# Patient Record
Sex: MALE | Race: WHITE | NOT HISPANIC OR LATINO | Employment: FULL TIME | URBAN - METROPOLITAN AREA
[De-identification: names, ages, dates, MRNs, and addresses within clinical notes are randomized per-mention and may not be internally consistent; named-entity substitution may affect disease eponyms.]

---

## 2024-06-19 ENCOUNTER — HOSPITAL ENCOUNTER (EMERGENCY)
Facility: HOSPITAL | Age: 24
Discharge: HOME | End: 2024-06-20
Attending: EMERGENCY MEDICINE
Payer: COMMERCIAL

## 2024-06-19 DIAGNOSIS — Z3A.01 LESS THAN 8 WEEKS GESTATION OF PREGNANCY: ICD-10-CM

## 2024-06-19 DIAGNOSIS — O21.9 VOMITING DURING PREGNANCY: Primary | ICD-10-CM

## 2024-06-19 DIAGNOSIS — N30.00 ACUTE CYSTITIS WITHOUT HEMATURIA: ICD-10-CM

## 2024-06-19 PROCEDURE — 99284 EMERGENCY DEPT VISIT MOD MDM: CPT

## 2024-06-19 RX ORDER — ONDANSETRON HYDROCHLORIDE 2 MG/ML
8 INJECTION, SOLUTION INTRAVENOUS
Status: COMPLETED | OUTPATIENT
Start: 2024-06-20 | End: 2024-06-20

## 2024-06-19 NOTE — Clinical Note
"Gagandeep Leija" Abhay was seen and treated in our emergency department on 6/19/2024.  He may return to work on 06/21/2024.       If you have any questions or concerns, please don't hesitate to call.      Idania Fitch RN    "

## 2024-06-20 VITALS
RESPIRATION RATE: 18 BRPM | HEART RATE: 80 BPM | WEIGHT: 145 LBS | OXYGEN SATURATION: 99 % | HEIGHT: 63 IN | BODY MASS INDEX: 25.69 KG/M2 | TEMPERATURE: 98 F | SYSTOLIC BLOOD PRESSURE: 120 MMHG | DIASTOLIC BLOOD PRESSURE: 72 MMHG

## 2024-06-20 LAB
ADDITIONAL FINDINGS (OHS): ABNORMAL
ALBUMIN SERPL-MCNC: 4 G/DL (ref 3.5–5)
ALBUMIN/GLOB SERPL: 1.2 RATIO (ref 1.1–2)
ALP SERPL-CCNC: 51 UNIT/L (ref 40–150)
ALT SERPL-CCNC: 11 UNIT/L (ref 0–55)
ANION GAP SERPL CALC-SCNC: 10 MEQ/L
AST SERPL-CCNC: 14 UNIT/L (ref 5–34)
B-HCG UR QL: POSITIVE
BACTERIA #/AREA URNS AUTO: ABNORMAL /HPF
BASOPHILS # BLD AUTO: 0.06 X10(3)/MCL
BASOPHILS NFR BLD AUTO: 0.4 %
BILIRUB SERPL-MCNC: 0.6 MG/DL
BILIRUB UR QL STRIP.AUTO: NEGATIVE
BUN SERPL-MCNC: 5 MG/DL (ref 8.9–20.6)
CALCIUM SERPL-MCNC: 9.2 MG/DL (ref 8.4–10.2)
CHLORIDE SERPL-SCNC: 110 MMOL/L (ref 98–107)
CLARITY UR: CLEAR
CO2 SERPL-SCNC: 18 MMOL/L (ref 22–29)
COLOR UR AUTO: YELLOW
CREAT SERPL-MCNC: 0.69 MG/DL (ref 0.73–1.18)
CREAT/UREA NIT SERPL: 7
EOSINOPHIL # BLD AUTO: 0.05 X10(3)/MCL (ref 0–0.9)
EOSINOPHIL NFR BLD AUTO: 0.4 %
ERYTHROCYTE [DISTWIDTH] IN BLOOD BY AUTOMATED COUNT: 11.8 % (ref 11.5–17)
GFR SERPLBLD CREATININE-BSD FMLA CKD-EPI: >60 ML/MIN/1.73/M2
GLOBULIN SER-MCNC: 3.4 GM/DL (ref 2.4–3.5)
GLUCOSE SERPL-MCNC: 103 MG/DL (ref 74–100)
GLUCOSE UR QL STRIP: NEGATIVE
HCT VFR BLD AUTO: 37.8 % (ref 42–52)
HGB BLD-MCNC: 13 G/DL (ref 14–18)
HGB UR QL STRIP: NEGATIVE
IMM GRANULOCYTES # BLD AUTO: 0.06 X10(3)/MCL (ref 0–0.04)
IMM GRANULOCYTES NFR BLD AUTO: 0.4 %
KETONES UR QL STRIP: ABNORMAL
LEUKOCYTE ESTERASE UR QL STRIP: ABNORMAL
LYMPHOCYTES # BLD AUTO: 3.25 X10(3)/MCL (ref 0.6–4.6)
LYMPHOCYTES NFR BLD AUTO: 23.4 %
MCH RBC QN AUTO: 29.9 PG (ref 27–31)
MCHC RBC AUTO-ENTMCNC: 34.4 G/DL (ref 33–36)
MCV RBC AUTO: 86.9 FL (ref 80–94)
MONOCYTES # BLD AUTO: 0.68 X10(3)/MCL (ref 0.1–1.3)
MONOCYTES NFR BLD AUTO: 4.9 %
NEUTROPHILS # BLD AUTO: 9.77 X10(3)/MCL (ref 2.1–9.2)
NEUTROPHILS NFR BLD AUTO: 70.5 %
NITRITE UR QL STRIP: NEGATIVE
PH UR STRIP: 6.5 [PH]
PLATELET # BLD AUTO: 117 X10(3)/MCL (ref 130–400)
PLATELET # BLD EST: ABNORMAL 10*3/UL
PMV BLD AUTO: 10 FL (ref 7.4–10.4)
POTASSIUM SERPL-SCNC: 3.4 MMOL/L (ref 3.5–5.1)
PROT SERPL-MCNC: 7.4 GM/DL (ref 6.4–8.3)
PROT UR QL STRIP: NEGATIVE
RBC # BLD AUTO: 4.35 X10(6)/MCL
RBC #/AREA URNS AUTO: ABNORMAL /HPF
SODIUM SERPL-SCNC: 138 MMOL/L (ref 136–145)
SP GR UR STRIP.AUTO: 1.02 (ref 1–1.03)
SQUAMOUS #/AREA URNS AUTO: ABNORMAL /HPF
UROBILINOGEN UR STRIP-ACNC: 0.2
WBC # BLD AUTO: 13.87 X10(3)/MCL (ref 4.5–11.5)
WBC #/AREA URNS AUTO: ABNORMAL /HPF

## 2024-06-20 PROCEDURE — 81025 URINE PREGNANCY TEST: CPT | Performed by: EMERGENCY MEDICINE

## 2024-06-20 PROCEDURE — 80053 COMPREHEN METABOLIC PANEL: CPT | Performed by: EMERGENCY MEDICINE

## 2024-06-20 PROCEDURE — 63600175 PHARM REV CODE 636 W HCPCS: Performed by: EMERGENCY MEDICINE

## 2024-06-20 PROCEDURE — 96375 TX/PRO/DX INJ NEW DRUG ADDON: CPT

## 2024-06-20 PROCEDURE — 87077 CULTURE AEROBIC IDENTIFY: CPT | Performed by: EMERGENCY MEDICINE

## 2024-06-20 PROCEDURE — 96361 HYDRATE IV INFUSION ADD-ON: CPT

## 2024-06-20 PROCEDURE — 85025 COMPLETE CBC W/AUTO DIFF WBC: CPT | Performed by: EMERGENCY MEDICINE

## 2024-06-20 PROCEDURE — 96365 THER/PROPH/DIAG IV INF INIT: CPT

## 2024-06-20 PROCEDURE — 25000003 PHARM REV CODE 250: Performed by: EMERGENCY MEDICINE

## 2024-06-20 PROCEDURE — 81003 URINALYSIS AUTO W/O SCOPE: CPT | Performed by: EMERGENCY MEDICINE

## 2024-06-20 RX ORDER — NITROFURANTOIN 25; 75 MG/1; MG/1
100 CAPSULE ORAL 2 TIMES DAILY
Qty: 10 CAPSULE | Refills: 0 | Status: SHIPPED | OUTPATIENT
Start: 2024-06-20 | End: 2024-06-25

## 2024-06-20 RX ORDER — ONDANSETRON HYDROCHLORIDE 8 MG/1
8 TABLET, FILM COATED ORAL EVERY 6 HOURS PRN
Qty: 8 TABLET | Refills: 0 | Status: SHIPPED | OUTPATIENT
Start: 2024-06-20

## 2024-06-20 RX ADMIN — ONDANSETRON 8 MG: 2 INJECTION INTRAMUSCULAR; INTRAVENOUS at 12:06

## 2024-06-20 RX ADMIN — POTASSIUM BICARBONATE 25 MEQ: 977.5 TABLET, EFFERVESCENT ORAL at 01:06

## 2024-06-20 RX ADMIN — CEFTRIAXONE SODIUM 1 G: 1 INJECTION, POWDER, FOR SOLUTION INTRAMUSCULAR; INTRAVENOUS at 01:06

## 2024-06-20 RX ADMIN — SODIUM CHLORIDE, SODIUM LACTATE, POTASSIUM CHLORIDE, CALCIUM CHLORIDE AND DEXTROSE MONOHYDRATE 1000 ML: 5; 600; 310; 30; 20 INJECTION, SOLUTION INTRAVENOUS at 12:06

## 2024-06-20 NOTE — DISCHARGE INSTRUCTIONS
1. It's important to keep hydrated throughout the next 24 hours: Water, variously resist Gatorade, Powerade etc. are good ways to do this. No soft drinks and certainly no diet soft drinks. Avoid orange juice rapid juice in the first 24 hours in that this may actually precipitate additional vomiting. The key to adequate hydration with gastritis and the requisite nausea is small aliquots of drinking very frequently to avoid precipitating vomiting.    2. As tolerated, advance after 12-18 hours to beef bullion, chicken noodle soup, egg-drop or wonton soups. Mash potatoes, scrambled eggs, applesauce and pudding's are also a good way to get calories and sustenance.    3. Zofran has been prescribed to help with nausea. Take as directed.    4. seek reevaluation should urinary output drop significantly below normal for 6-8 hours, inability to tolerate any fluids whatsoever, fever with vomiting, or accelerating/persistent abdominal pain.

## 2024-06-20 NOTE — ED PROVIDER NOTES
Encounter Date: 6/19/2024       History     Chief Complaint   Patient presents with    Emesis     Pt c/o vomiting x 1 week. Pt is 6 weeks pregnant.     Gagandeep is a 23-year-old genotypic female, completely transitioned (phenotypic) male who presents with recurrent, multiple episodes of non-bilious vomiting for 6-7 days. He is 6 weeks pregnant, by dates,     The history is provided by the patient.     Review of patient's allergies indicates:  No Known Allergies  History reviewed. No pertinent past medical history.  History reviewed. No pertinent surgical history.  No family history on file.     Review of Systems   Constitutional:  Negative for fever.   Eyes:  Negative for visual disturbance.   Respiratory:  Negative for shortness of breath.    Cardiovascular:  Negative for chest pain.   Gastrointestinal:  Negative for nausea.   Endocrine: Negative for polydipsia, polyphagia and polyuria.   Genitourinary:  Negative for dysuria, flank pain, hematuria and pelvic pain.   Musculoskeletal:  Negative for back pain.   Skin:  Negative for wound.   Allergic/Immunologic: Negative for immunocompromised state.   Neurological:  Positive for weakness. Negative for headaches.   Hematological:  Does not bruise/bleed easily.   Psychiatric/Behavioral: Negative.     All other systems reviewed and are negative.      Physical Exam     Initial Vitals [06/19/24 2335]   BP Pulse Resp Temp SpO2   116/79 81 20 98.3 °F (36.8 °C) 97 %      MAP       --         Physical Exam    ED Course   Procedures  Labs Reviewed   COMPREHENSIVE METABOLIC PANEL - Abnormal; Notable for the following components:       Result Value    Potassium 3.4 (*)     Chloride 110 (*)     CO2 18 (*)     Glucose 103 (*)     Blood Urea Nitrogen 5.0 (*)     Creatinine 0.69 (*)     All other components within normal limits   CBC WITH DIFFERENTIAL - Abnormal; Notable for the following components:    WBC 13.87 (*)     Hgb 13.0 (*)     Hct 37.8 (*)     Platelet 117 (*)     Neut # 9.77  (*)     IG# 0.06 (*)     All other components within normal limits   BLOOD SMEAR MICROSCOPIC EXAM (OLG) - Abnormal; Notable for the following components:    Platelets Decreased (*)     All other components within normal limits   URINALYSIS, REFLEX TO URINE CULTURE - Abnormal; Notable for the following components:    Ketones, UA Trace (*)     Leukocyte Esterase, UA 1+ (*)     All other components within normal limits   HCG QUALITATIVE URINE - Abnormal; Notable for the following components:    hCG Qualitative, Urine Positive (*)     All other components within normal limits   URINALYSIS, MICROSCOPIC - Abnormal; Notable for the following components:    Bacteria, UA Many (*)     WBC, UA 11-20 (*)     Squamous Epithelial Cells, UA Few (*)     All other components within normal limits   CULTURE, URINE   CBC W/ AUTO DIFFERENTIAL    Narrative:     The following orders were created for panel order CBC auto differential.  Procedure                               Abnormality         Status                     ---------                               -----------         ------                     CBC with Differential[[id number]]       Abnormal            Final result                 Please view results for these tests on the individual orders.          Imaging Results    None          Medications   cefTRIAXone (Rocephin) 1 g in dextrose 5 % in water (D5W) 100 mL IVPB (MB+) (1 g Intravenous New Bag 6/20/24 0144)   dextrose 5% lactated ringers bolus 1,000 mL (0 mLs Intravenous Stopped 6/20/24 0116)   ondansetron injection 8 mg (8 mg Intravenous Given 6/20/24 0011)   potassium bicarbonate disintegrating tablet 25 mEq (25 mEq Oral Given 6/20/24 0122)     Medical Decision Making  Amount and/or Complexity of Data Reviewed  Labs: ordered.    Risk  Prescription drug management.               ED Course as of 06/20/24 0202   u Jun 20, 2024   0018 Creatinine(!): 0.69 [KJ]   0041 Potassium(!): 3.4 [KJ]   0042 Chloride(!): 110 [KJ]   0042  "CO2(!): 18 [KJ]   0042 Glucose(!): 103 [KJ]   0042 BUN(!): 5.0 [KJ]   0042 WBC(!): 13.87 [KJ]   0133 Bacteria, UA(!): Many [KJ]   0133 RBC, UA: None Seen [KJ]   0133 WBC, UA(!): 11-20 [KJ]   0133 Squam Epithel, UA(!): Few [KJ]   0133 hCG Qualitative, Urine(!): Positive [KJ]   0159 Tolerated parental hydration in the emergency department without difficulty.  Potassium supplemented with 25 mEq p.o..      Urine HCG is positive, confirmed.  Likewise, urinalysis consistent with urinary tract infection: Patient then informs "I seemed to be prone to getting those. "    Upon reassessment, feeling better, clinically improved.  Rocephin, 1 g IV piggyback, administered here in the department given "his" proclivity for urinary tract infections and the fact that "he" is in "his" 1st trimester of pregnancy.   [KJ]      ED Course User Index  [KJ] Blanco Ugarte MD                             Clinical Impression:  Final diagnoses:  [Z3A.01] Less than 8 weeks gestation of pregnancy  [O21.9] Vomiting during pregnancy (Primary)  [N30.00] Acute cystitis without hematuria          ED Disposition Condition    Discharge Stable          ED Prescriptions       Medication Sig Dispense Start Date End Date Auth. Provider    nitrofurantoin, macrocrystal-monohydrate, (MACROBID) 100 MG capsule Take 1 capsule (100 mg total) by mouth 2 (two) times daily. for 5 days 10 capsule 6/20/2024 6/25/2024 Blanco Ugarte MD          Follow-up Information    None       Afebrile. Vasomotor Stable. Ambulating in the ED. Pleased with care.  Patient specifically advised, by me, to RETURN to the emergency department for ANY deterioration, detrimental changes, failure to improve, feeling worse and/or ANY concerns whatsoever. Verbalized understanding and agreed to comply.        This treatment record was composed utilizing a combination of typing and the M*Modal Fluency Direct dictation system. Some errors in transcription, wording and spelling are, therefore, " to be expected.       Blanco Ugarte MD  06/20/24 0208

## 2024-06-22 ENCOUNTER — TELEPHONE (OUTPATIENT)
Dept: EMERGENCY MEDICINE | Facility: HOSPITAL | Age: 24
End: 2024-06-22
Payer: COMMERCIAL

## 2024-06-22 LAB — BACTERIA UR CULT: ABNORMAL

## 2024-07-29 LAB
ANTIBODY SCREEN: NORMAL
HBV SURFACE AG SERPL QL IA: NORMAL
HCV AB SERPL QL IA: NEGATIVE
HIV 1+2 AB+HIV1 P24 AG SERPL QL IA: NORMAL
RUBV IGG SER QL: NORMAL
T PALLIDUM AB SER QL: NONREACTIVE

## 2024-10-16 LAB
GLUCOSE SERPL-MCNC: 138 MG/DL
HCT VFR BLD AUTO: 34.1 %
HGB BLD-MCNC: 11.4 G/DL
MCV RBC AUTO: 92 FL (ref 82–108)
PLATELET # BLD AUTO: 268 K/UL (ref 150–399)

## 2025-02-10 ENCOUNTER — HOSPITAL ENCOUNTER (INPATIENT)
Facility: HOSPITAL | Age: 25
LOS: 2 days | Discharge: HOME | End: 2025-02-12
Attending: OBSTETRICS & GYNECOLOGY | Admitting: OBSTETRICS & GYNECOLOGY
Payer: COMMERCIAL

## 2025-02-10 DIAGNOSIS — Z37.9 NORMAL LABOR: ICD-10-CM

## 2025-02-10 DIAGNOSIS — O48.0 POST TERM PREGNANCY OVER 40 WEEKS: ICD-10-CM

## 2025-02-10 LAB
BASOPHILS # BLD AUTO: 0.04 X10(3)/MCL
BASOPHILS NFR BLD AUTO: 0.3 %
CTP QC/QA: YES
EOSINOPHIL # BLD AUTO: 0.06 X10(3)/MCL (ref 0–0.9)
EOSINOPHIL NFR BLD AUTO: 0.4 %
ERYTHROCYTE [DISTWIDTH] IN BLOOD BY AUTOMATED COUNT: 13.2 % (ref 11.5–17)
GROUP & RH: NORMAL
HCT VFR BLD AUTO: 34.5 % (ref 42–52)
HGB BLD-MCNC: 11.2 G/DL (ref 14–18)
IMM GRANULOCYTES # BLD AUTO: 0.07 X10(3)/MCL (ref 0–0.04)
IMM GRANULOCYTES NFR BLD AUTO: 0.5 %
INDIRECT COOMBS: NORMAL
LYMPHOCYTES # BLD AUTO: 2.95 X10(3)/MCL (ref 0.6–4.6)
LYMPHOCYTES NFR BLD AUTO: 21.7 %
MCH RBC QN AUTO: 25.7 PG (ref 27–31)
MCHC RBC AUTO-ENTMCNC: 32.5 G/DL (ref 33–36)
MCV RBC AUTO: 79.3 FL (ref 80–94)
MONOCYTES # BLD AUTO: 0.85 X10(3)/MCL (ref 0.1–1.3)
MONOCYTES NFR BLD AUTO: 6.3 %
NEUTROPHILS # BLD AUTO: 9.6 X10(3)/MCL (ref 2.1–9.2)
NEUTROPHILS NFR BLD AUTO: 70.8 %
NRBC BLD AUTO-RTO: 0 %
PLATELET # BLD AUTO: 286 X10(3)/MCL (ref 130–400)
PMV BLD AUTO: 10.6 FL (ref 7.4–10.4)
PRENATAL STREP B CULTURE: NORMAL
RBC # BLD AUTO: 4.35 X10(6)/MCL (ref 4.7–6.1)
RUPTURE OF MEMBRANE: POSITIVE
SPECIMEN OUTDATE: NORMAL
T PALLIDUM AB SER QL: NONREACTIVE
WBC # BLD AUTO: 13.57 X10(3)/MCL (ref 4.5–11.5)

## 2025-02-10 PROCEDURE — 11000001 HC ACUTE MED/SURG PRIVATE ROOM

## 2025-02-10 PROCEDURE — 86850 RBC ANTIBODY SCREEN: CPT | Performed by: OBSTETRICS & GYNECOLOGY

## 2025-02-10 PROCEDURE — 99285 EMERGENCY DEPT VISIT HI MDM: CPT

## 2025-02-10 PROCEDURE — 84112 EVAL AMNIOTIC FLUID PROTEIN: CPT

## 2025-02-10 PROCEDURE — 86780 TREPONEMA PALLIDUM: CPT | Performed by: OBSTETRICS & GYNECOLOGY

## 2025-02-10 PROCEDURE — 85025 COMPLETE CBC W/AUTO DIFF WBC: CPT | Performed by: OBSTETRICS & GYNECOLOGY

## 2025-02-10 RX ORDER — OXYTOCIN-SODIUM CHLORIDE 0.9% IV SOLN 30 UNIT/500ML 30-0.9/5 UT/ML-%
10 SOLUTION INTRAVENOUS ONCE AS NEEDED
OUTPATIENT
Start: 2025-02-10 | End: 2036-07-09

## 2025-02-10 RX ORDER — METHYLERGONOVINE MALEATE 0.2 MG/ML
200 INJECTION INTRAVENOUS ONCE AS NEEDED
OUTPATIENT
Start: 2025-02-10 | End: 2036-07-09

## 2025-02-10 RX ORDER — ONDANSETRON 4 MG/1
8 TABLET, ORALLY DISINTEGRATING ORAL EVERY 8 HOURS PRN
Status: DISCONTINUED | OUTPATIENT
Start: 2025-02-10 | End: 2025-02-11

## 2025-02-10 RX ORDER — LIDOCAINE HYDROCHLORIDE 10 MG/ML
10 INJECTION, SOLUTION INFILTRATION; PERINEURAL ONCE AS NEEDED
OUTPATIENT
Start: 2025-02-10 | End: 2036-07-09

## 2025-02-10 RX ORDER — OXYTOCIN-SODIUM CHLORIDE 0.9% IV SOLN 30 UNIT/500ML 30-0.9/5 UT/ML-%
95 SOLUTION INTRAVENOUS ONCE AS NEEDED
OUTPATIENT
Start: 2025-02-10 | End: 2036-07-09

## 2025-02-10 RX ORDER — OXYTOCIN 10 [USP'U]/ML
10 INJECTION, SOLUTION INTRAMUSCULAR; INTRAVENOUS ONCE AS NEEDED
OUTPATIENT
Start: 2025-02-10 | End: 2036-07-09

## 2025-02-10 RX ORDER — MUPIROCIN 20 MG/G
OINTMENT TOPICAL
OUTPATIENT
Start: 2025-02-10

## 2025-02-10 RX ORDER — CALCIUM CARBONATE 200(500)MG
500 TABLET,CHEWABLE ORAL 3 TIMES DAILY PRN
Status: DISCONTINUED | OUTPATIENT
Start: 2025-02-10 | End: 2025-02-11

## 2025-02-10 RX ORDER — SODIUM CHLORIDE, SODIUM LACTATE, POTASSIUM CHLORIDE, CALCIUM CHLORIDE 600; 310; 30; 20 MG/100ML; MG/100ML; MG/100ML; MG/100ML
INJECTION, SOLUTION INTRAVENOUS CONTINUOUS
Status: DISCONTINUED | OUTPATIENT
Start: 2025-02-10 | End: 2025-02-11

## 2025-02-10 RX ORDER — MISOPROSTOL 100 UG/1
800 TABLET ORAL ONCE AS NEEDED
OUTPATIENT
Start: 2025-02-10 | End: 2036-07-09

## 2025-02-10 RX ORDER — MISOPROSTOL 100 UG/1
800 TABLET ORAL ONCE AS NEEDED
OUTPATIENT
Start: 2025-02-10

## 2025-02-10 RX ORDER — SIMETHICONE 80 MG
1 TABLET,CHEWABLE ORAL 4 TIMES DAILY PRN
Status: DISCONTINUED | OUTPATIENT
Start: 2025-02-10 | End: 2025-02-11

## 2025-02-10 RX ORDER — CARBOPROST TROMETHAMINE 250 UG/ML
250 INJECTION, SOLUTION INTRAMUSCULAR
OUTPATIENT
Start: 2025-02-10

## 2025-02-10 RX ORDER — DIPHENOXYLATE HYDROCHLORIDE AND ATROPINE SULFATE 2.5; .025 MG/1; MG/1
2 TABLET ORAL EVERY 6 HOURS PRN
OUTPATIENT
Start: 2025-02-10

## 2025-02-10 RX ORDER — OXYTOCIN-SODIUM CHLORIDE 0.9% IV SOLN 30 UNIT/500ML 30-0.9/5 UT/ML-%
95 SOLUTION INTRAVENOUS CONTINUOUS PRN
OUTPATIENT
Start: 2025-02-10

## 2025-02-11 PROCEDURE — 63600175 PHARM REV CODE 636 W HCPCS

## 2025-02-11 PROCEDURE — 25000003 PHARM REV CODE 250: Performed by: OBSTETRICS & GYNECOLOGY

## 2025-02-11 PROCEDURE — 72200004 HC VAGINAL DELIVERY LEVEL I

## 2025-02-11 PROCEDURE — 0KQM0ZZ REPAIR PERINEUM MUSCLE, OPEN APPROACH: ICD-10-PCS | Performed by: OBSTETRICS & GYNECOLOGY

## 2025-02-11 PROCEDURE — 72100002 HC LABOR CARE, 1ST 8 HOURS

## 2025-02-11 PROCEDURE — 11000001 HC ACUTE MED/SURG PRIVATE ROOM

## 2025-02-11 RX ORDER — ONDANSETRON 4 MG/1
8 TABLET, ORALLY DISINTEGRATING ORAL EVERY 8 HOURS PRN
Status: DISCONTINUED | OUTPATIENT
Start: 2025-02-11 | End: 2025-02-12 | Stop reason: HOSPADM

## 2025-02-11 RX ORDER — ADHESIVE BANDAGE
30 BANDAGE TOPICAL NIGHTLY PRN
Status: DISCONTINUED | OUTPATIENT
Start: 2025-02-11 | End: 2025-02-12 | Stop reason: HOSPADM

## 2025-02-11 RX ORDER — SIMETHICONE 80 MG
1 TABLET,CHEWABLE ORAL EVERY 6 HOURS PRN
Status: DISCONTINUED | OUTPATIENT
Start: 2025-02-11 | End: 2025-02-12 | Stop reason: HOSPADM

## 2025-02-11 RX ORDER — DIPHENHYDRAMINE HYDROCHLORIDE 50 MG/ML
25 INJECTION INTRAMUSCULAR; INTRAVENOUS EVERY 4 HOURS PRN
Status: DISCONTINUED | OUTPATIENT
Start: 2025-02-11 | End: 2025-02-12 | Stop reason: HOSPADM

## 2025-02-11 RX ORDER — METHYLERGONOVINE MALEATE 0.2 MG/ML
200 INJECTION INTRAVENOUS ONCE AS NEEDED
Status: DISCONTINUED | OUTPATIENT
Start: 2025-02-11 | End: 2025-02-12 | Stop reason: HOSPADM

## 2025-02-11 RX ORDER — BISACODYL 10 MG/1
10 SUPPOSITORY RECTAL DAILY PRN
Status: DISCONTINUED | OUTPATIENT
Start: 2025-02-11 | End: 2025-02-12 | Stop reason: HOSPADM

## 2025-02-11 RX ORDER — HYDROCORTISONE 25 MG/G
CREAM TOPICAL 3 TIMES DAILY PRN
Status: DISCONTINUED | OUTPATIENT
Start: 2025-02-11 | End: 2025-02-12 | Stop reason: HOSPADM

## 2025-02-11 RX ORDER — MISOPROSTOL 100 UG/1
800 TABLET ORAL ONCE AS NEEDED
Status: DISCONTINUED | OUTPATIENT
Start: 2025-02-11 | End: 2025-02-12 | Stop reason: HOSPADM

## 2025-02-11 RX ORDER — OXYTOCIN-SODIUM CHLORIDE 0.9% IV SOLN 30 UNIT/500ML 30-0.9/5 UT/ML-%
95 SOLUTION INTRAVENOUS ONCE AS NEEDED
Status: DISCONTINUED | OUTPATIENT
Start: 2025-02-11 | End: 2025-02-12 | Stop reason: HOSPADM

## 2025-02-11 RX ORDER — CARBOPROST TROMETHAMINE 250 UG/ML
250 INJECTION, SOLUTION INTRAMUSCULAR
Status: DISCONTINUED | OUTPATIENT
Start: 2025-02-11 | End: 2025-02-12 | Stop reason: HOSPADM

## 2025-02-11 RX ORDER — OXYCODONE AND ACETAMINOPHEN 5; 325 MG/1; MG/1
1 TABLET ORAL EVERY 4 HOURS PRN
Status: DISCONTINUED | OUTPATIENT
Start: 2025-02-11 | End: 2025-02-12 | Stop reason: HOSPADM

## 2025-02-11 RX ORDER — LIDOCAINE HYDROCHLORIDE 10 MG/ML
INJECTION, SOLUTION INFILTRATION; PERINEURAL
Status: COMPLETED
Start: 2025-02-11 | End: 2025-02-11

## 2025-02-11 RX ORDER — OXYTOCIN-SODIUM CHLORIDE 0.9% IV SOLN 30 UNIT/500ML 30-0.9/5 UT/ML-%
30 SOLUTION INTRAVENOUS ONCE AS NEEDED
Status: DISCONTINUED | OUTPATIENT
Start: 2025-02-11 | End: 2025-02-12 | Stop reason: HOSPADM

## 2025-02-11 RX ORDER — IBUPROFEN 600 MG/1
600 TABLET ORAL EVERY 6 HOURS PRN
Status: DISCONTINUED | OUTPATIENT
Start: 2025-02-11 | End: 2025-02-12 | Stop reason: HOSPADM

## 2025-02-11 RX ORDER — ACETAMINOPHEN 325 MG/1
650 TABLET ORAL EVERY 6 HOURS PRN
Status: DISCONTINUED | OUTPATIENT
Start: 2025-02-11 | End: 2025-02-12 | Stop reason: HOSPADM

## 2025-02-11 RX ORDER — DOCUSATE SODIUM 100 MG/1
200 CAPSULE, LIQUID FILLED ORAL 2 TIMES DAILY PRN
Status: DISCONTINUED | OUTPATIENT
Start: 2025-02-11 | End: 2025-02-12 | Stop reason: HOSPADM

## 2025-02-11 RX ORDER — OXYCODONE AND ACETAMINOPHEN 10; 325 MG/1; MG/1
1 TABLET ORAL EVERY 4 HOURS PRN
Status: DISCONTINUED | OUTPATIENT
Start: 2025-02-11 | End: 2025-02-12 | Stop reason: HOSPADM

## 2025-02-11 RX ORDER — OXYTOCIN 10 [USP'U]/ML
10 INJECTION, SOLUTION INTRAMUSCULAR; INTRAVENOUS ONCE AS NEEDED
Status: DISCONTINUED | OUTPATIENT
Start: 2025-02-11 | End: 2025-02-12 | Stop reason: HOSPADM

## 2025-02-11 RX ORDER — OXYTOCIN-SODIUM CHLORIDE 0.9% IV SOLN 30 UNIT/500ML 30-0.9/5 UT/ML-%
95 SOLUTION INTRAVENOUS CONTINUOUS PRN
Status: DISCONTINUED | OUTPATIENT
Start: 2025-02-11 | End: 2025-02-12 | Stop reason: HOSPADM

## 2025-02-11 RX ORDER — PRENATAL WITH FERROUS FUM AND FOLIC ACID 3080; 920; 120; 400; 22; 1.84; 3; 20; 10; 1; 12; 200; 27; 25; 2 [IU]/1; [IU]/1; MG/1; [IU]/1; MG/1; MG/1; MG/1; MG/1; MG/1; MG/1; UG/1; MG/1; MG/1; MG/1; MG/1
1 TABLET ORAL DAILY
Status: DISCONTINUED | OUTPATIENT
Start: 2025-02-11 | End: 2025-02-12 | Stop reason: HOSPADM

## 2025-02-11 RX ORDER — DIPHENHYDRAMINE HCL 25 MG
25 CAPSULE ORAL EVERY 4 HOURS PRN
Status: DISCONTINUED | OUTPATIENT
Start: 2025-02-11 | End: 2025-02-12 | Stop reason: HOSPADM

## 2025-02-11 RX ORDER — DIPHENOXYLATE HYDROCHLORIDE AND ATROPINE SULFATE 2.5; .025 MG/1; MG/1
2 TABLET ORAL EVERY 6 HOURS PRN
Status: DISCONTINUED | OUTPATIENT
Start: 2025-02-11 | End: 2025-02-12 | Stop reason: HOSPADM

## 2025-02-11 RX ADMIN — IBUPROFEN 600 MG: 600 TABLET, FILM COATED ORAL at 02:02

## 2025-02-11 RX ADMIN — IBUPROFEN 600 MG: 600 TABLET, FILM COATED ORAL at 08:02

## 2025-02-11 RX ADMIN — LIDOCAINE HYDROCHLORIDE 200 MG: 10 INJECTION, SOLUTION INFILTRATION; PERINEURAL at 01:02

## 2025-02-11 RX ADMIN — DOCUSATE SODIUM 200 MG: 100 CAPSULE, LIQUID FILLED ORAL at 08:02

## 2025-02-11 RX ADMIN — PRENATAL VITAMINS-IRON FUMARATE 27 MG IRON-FOLIC ACID 0.8 MG TABLET 1 TABLET: at 08:02

## 2025-02-11 RX ADMIN — ONDANSETRON 8 MG: 4 TABLET, ORALLY DISINTEGRATING ORAL at 11:02

## 2025-02-11 RX ADMIN — OXYCODONE HYDROCHLORIDE AND ACETAMINOPHEN 1 TABLET: 10; 325 TABLET ORAL at 02:02

## 2025-02-11 RX ADMIN — Medication: at 03:02

## 2025-02-11 NOTE — H&P
HISTORY AND PHYSICAL                                                OBSTETRICS          Subjective:      Gagandeep Blank is a 24 y.o.   with IUP at 40w6d weeks gestation who presents to L&D for labor. Pertinent medical history for this pregnancy includes patient previously was taking testosterone for female to male transition but stopped medication in .  Care this pregnancy has been with [facility]    PMHx: History reviewed. No pertinent past medical history.    PSHx:   Past Surgical History:   Procedure Laterality Date    MASTECTOMY      WISDOM TOOTH EXTRACTION         All:   Review of patient's allergies indicates:   Allergen Reactions    Bactrim [sulfamethoxazole-trimethoprim] Hives       Meds:   Medications Prior to Admission   Medication Sig Dispense Refill Last Dose/Taking    doxylamine succinate/vit B6 (DICLEGIS ORAL) Take by mouth.   2/10/2025    prenatal vit/iron fum/folic ac (PRENATAL 1+1 ORAL) Take by mouth.   Past Month    ondansetron (ZOFRAN) 8 MG tablet Take 1 tablet (8 mg total) by mouth every 6 (six) hours as needed for Nausea. 8 tablet 0        SH:   Social History     Socioeconomic History    Marital status: Single   Tobacco Use    Smoking status: Never    Smokeless tobacco: Never   Substance and Sexual Activity    Alcohol use: Not Currently    Drug use: Never    Sexual activity: Yes     Social Drivers of Health     Financial Resource Strain: Low Risk  (2/10/2025)    Overall Financial Resource Strain (CARDIA)     Difficulty of Paying Living Expenses: Not hard at all   Food Insecurity: No Food Insecurity (2/10/2025)    Hunger Vital Sign     Worried About Running Out of Food in the Last Year: Never true     Ran Out of Food in the Last Year: Never true   Transportation Needs: No Transportation Needs (2/10/2025)    TRANSPORTATION NEEDS     Transportation : No   Stress: No Stress Concern Present (2/10/2025)    [employer] [place] [employer] - Occupational Stress  "Questionnaire     Feeling of Stress : Not at all   Housing Stability: Low Risk  (2/10/2025)    Housing Stability Vital Sign     Unable to Pay for Housing in the Last Year: No     Homeless in the Last Year: No       FH:   Family History   Problem Relation Name Age of Onset    No Known Problems Mother      No Known Problems Sister      No Known Problems Brother         OBHx:   OB History    Para Term  AB Living   1 0 0 0 0 0   SAB IAB Ectopic Multiple Live Births   0 0 0 0 0      # Outcome Date GA Lbr Andrew/2nd Weight Sex Type Anes PTL Lv   1 Current                Objective:      /82   Pulse 99   Temp 98.8 °F (37.1 °C)   Resp 18   Ht 5' 3" (1.6 m)   Wt 90.7 kg (200 lb)   LMP 2024 (Exact Date)   SpO2 98%   Breastfeeding No   BMI 35.43 kg/m²   Body mass index is 35.43 kg/m².    General:   alert and cooperative   HEENT:  normocephalic, atraumatic   Lungs:   clear to auscultation bilaterally   Heart:   regular rate and rhythm, S1, S2 normal   Abdomen:  gravid, non-tender   Extremities non-tender, no edema   Derm: no rashes or lesions   Psych: appropriate mood and affect   Pelvis:  adequate         Lab Review  GBS: negative      Assessment:     24 y.o.  at 40w6d weeks gestation.  Labor    There are no hospital problems to display for this patient.         Plan:     1. Risks, benefits, alternatives and possible complications have been discussed in detail with the patient. All questions have been answered, and the patient has voiced understanding and agrees to the treatment plan.  2. Consents signed and in chart  3. Admit to Labor and Delivery unit         "

## 2025-02-11 NOTE — L&D DELIVERY NOTE
[facility] - Labor and Delivery  Vaginal Delivery   Operative Note    SUMMARY     Normal spontaneous vaginal delivery of live infant, was placed on mothers abdomen for skin to skin and bulb suctioning performed.  Infant delivered position OA over perineum.  Nuchal cord: Yes, cord reduced at perineum.    Spontaneous delivery of placenta good uterine tone.  2nd degree laceration noted and repaired in normal fashion with 2-0 chromic after injection of local anesthetic .  Patient tolerated delivery well. Sponge needle and lap counted correctly x2.    Indications: Vaginal delivery  Pregnancy complicated by:   Patient Active Problem List   Diagnosis    Vaginal delivery     Admitting GA: 41w0d    Delivery Information for Rajeev Blank    Birth information:  YOB: 2025   Time of birth: 1:29 AM   Sex: female   Head Delivery Date/Time: 2025  1:29 AM   Delivery type: , Spontaneous   Gestational Age: 41w0d       Delivery Providers    Delivering clinician: Lashay Junior MD   Provider Role    Collette, Sybil, RN Registered Nurse    Almita Almeida RN Registered Nurse              Measurements    Weight:   Length:          Apgars    Living status:   Apgar Component Scores:  1 min.:  5 min.:  10 min.:  15 min.:  20 min.:    Skin color:         Heart rate:         Reflex irritability:         Muscle tone:         Respiratory effort:         Total:                  Operative Delivery    Forceps attempted?: No  Vacuum extractor attempted?: No         Shoulder Dystocia    Shoulder dystocia present?: No           Presentation    Presentation: Vertex  Position: Right Occiput Anterior           Interventions/Resuscitation           Cord    Vessels: 3 vessels  Complications: None  Delayed Cord Clamping?: Yes  Cord Blood Disposition: Sent with Baby  Gases Sent?: No  Stem Cell Collection (by MD): No       Placenta    Placenta delivery date/time: 2025 0147  Placenta removal:  Spontaneous  Placenta appearance: Intact  Placenta disposition: Family           Labor Events:       labor: No     Labor Onset Date/Time: 02/10/2025 00:17     Dilation Complete Date/Time: 02/10/2025 23:55     Start Pushing Date/Time: 02/10/2025 23:55     Rupture Date/Time: 02/10/25 0017        Rupture type:          Fluid Amount:       Fluid Color: Clear      steroids: None     Antibiotics given for GBS: No     Induction: none     Indications for induction:        Augmentation:       Indications for augmentation:       Labor complications: None     Additional complications:          Cervical ripening:                     Delivery:      Episiotomy: None     Indication for Episiotomy:       Perineal Lacerations: 2nd Repaired:      Periurethral Laceration:   Repaired:     Labial Laceration:   Repaired:     Sulcus Laceration:   Repaired:     Vaginal Laceration:   Repaired:     Cervical Laceration:   Repaired:     Repair suture:       Repair # of packets: 1     Last Value - EBL - Nursing (mL):       Sum - EBL - Nursing (mL): 0     Last Value - EBL - Anesthesia (mL):      Calculated QBL (mL):       Running total QBL (mL):       Vaginal Sweep Performed: Yes     Surgicount Correct: Yes       Other providers:       Anesthesia    Method: None          Details (if applicable):  Trial of Labor      Categorization:      Priority:     Indications for :     Incision Type:       Additional  information:  Forceps:    Vacuum:    Breech:    Observed anomalies    Other (Comments):

## 2025-02-11 NOTE — PROGRESS NOTES
PostPartum Progress Note        Subjective:      Post-Partum Day #0 after uncomplicated vaginal delivery.    Patient is without complaints. Lochia decreasing. Bottle feeding. Pain is well controlled. Patient is ambulating. Tolerating Full Regular diet.Overall mother and baby are doing well.     Objective:      Temp:  [98.3 °F (36.8 °C)-98.8 °F (37.1 °C)] 98.3 °F (36.8 °C)  Pulse:  [] 90  Resp:  [16-18] 18  SpO2:  [97 %-100 %] 97 %  BP: (117-149)/(60-85) 139/78    Intake/Output Summary (Last 24 hours) at 2025 0812  Last data filed at 2025 0211  Gross per 24 hour   Intake --   Output 200 ml   Net -200 ml     Body mass index is 35.43 kg/m².    General: no acute distress  Abdomen: soft, non-tender, non-distended; Fundus firm and below the umbilicus     Extremities: non-tender, symmetric, trace edema    Group & Rh   Date Value Ref Range Status   02/10/2025 B POS  Final     Recent Results (from the past 2 weeks)   CBC with Differential    Collection Time: 02/10/25  6:53 PM   Result Value Ref Range    WBC 13.57 (H) 4.50 - 11.50 x10(3)/mcL    Hgb 11.2 (L) 14.0 - 18.0 g/dL    Hct 34.5 (L) 42.0 - 52.0 %    Platelet 286 130 - 400 x10(3)/mcL          Assessment:     24 y.o.  S/P  Post-Partum Day #0  - Doing Well      Plan:     1. Continue routine postpartum care  2. Plan for D/C  in 1-2 days

## 2025-02-11 NOTE — ED PROVIDER NOTES
[facility] NOTE     Admit Date: 2/10/2025  [facility] Physician: Alli Shah  Primary OBGYN:Dr. Alejandrina Cheng    Admit Diagnosis/Chief Complaint: Leakage of Fluid and Contractions      Chief Complaint   Patient presents with    leaking fluid     Pt c/o leaking of fluid since 12:17AM and having irregular contractions.       HPI:  Gagandeep Blank is a 24 y.o.  at 40w6d presents complaining of rupture of membranes around 0017 this morning and noted to be clear fluid.  She began to have contractions that have progressively gotten stronger and closer together.  Now they are quite painful and every 2-3 minutes.      Patient states no complications in this pregnancy.     PMHx: History reviewed. No pertinent past medical history.    PSHx:   Past Surgical History:   Procedure Laterality Date    MASTECTOMY      WISDOM TOOTH EXTRACTION         All:   Review of patient's allergies indicates:   Allergen Reactions    Bactrim [sulfamethoxazole-trimethoprim] Hives       Meds:   Current Facility-Administered Medications:     calcium carbonate 200 mg calcium (500 mg) chewable tablet 500 mg, 500 mg, Oral, TID PRN, Lashay Junior MD    lactated ringers bolus 1,000 mL, 1,000 mL, Intravenous, PRN, Lashay Junior MD    lactated ringers bolus 500 mL, 500 mL, Intravenous, PRN, Lashay Junior MD    lactated ringers infusion, , Intravenous, Continuous, Lashay Junior MD    ondansetron disintegrating tablet 8 mg, 8 mg, Oral, Q8H PRN, Lashay Junior MD    simethicone chewable tablet 80 mg, 1 tablet, Oral, QID PRN, Lashay Junior MD    Current Outpatient Medications:     doxylamine succinate/vit B6 (DICLEGIS ORAL), Take by mouth., Disp: , Rfl:     prenatal vit/iron fum/folic ac (PRENATAL 1+1 ORAL), Take by mouth., Disp: , Rfl:     ondansetron (ZOFRAN) 8 MG tablet, Take 1 tablet (8 mg total) by mouth every 6 (six) hours as needed for Nausea., Disp: 8 tablet, Rfl: 0    SH:   Social History     Socioeconomic History     "Marital status: Single   Tobacco Use    Smoking status: Never    Smokeless tobacco: Never   Substance and Sexual Activity    Alcohol use: Not Currently    Sexual activity: Yes       FH: No family history on file.    OBHx:   OB History    Para Term  AB Living   1 0 0 0 0 0   SAB IAB Ectopic Multiple Live Births   0 0 0 0 0      # Outcome Date GA Lbr Andrew/2nd Weight Sex Type Anes PTL Lv   1 Current                Patient denies vaginal bleeding, headache, vision changes, RUQ pain, dysuria, fever, and nausea/vomiting.  Fetal Movement: normal.    /82   Pulse 99   Temp 98.8 °F (37.1 °C)   Resp 18   Ht 5' 3" (1.6 m)   Wt 90.7 kg (200 lb)   LMP 2024 (Exact Date)   SpO2 98%   Breastfeeding No   BMI 35.43 kg/m²   Temp:  [98.8 °F (37.1 °C)] 98.8 °F (37.1 °C)  Pulse:  [99] 99  Resp:  [18] 18  SpO2:  [98 %] 98 %  BP: (127)/(82) 127/82    General: in no apparent distress well developed and well nourished  Cardiovascular: Regular rate and rhythm  Lungs: Clear to auscultation bilaterally  Abdominal: soft, nontender, nondistended, no abnormal masses, no epigastric pain fundus soft, nontender consistent with 38-39 weeks size FHT present, Leopold's reveals longitudinal lie cephalic presentation and EFW approximately 7-1/2 lb  Back: lumbar tenderness absent   CVA tenderness none  Extremeties no redness or tenderness in the calves or thighs no edema DTRs +1 over 4 bilaterally at the knees and no evidence of ankle clonus      SVE:SVE (PeriWATCH)  Dilation (cm): 6  Effacement (%): 100  Station: -2  Simplified Mauro Score: 7     FHT:  130's, Reactive, Category 1, and Reassuring for gestational age  TOCO: Contractions every 2-4 min    Prenatal labs:  B, Rh positive, Rubella nonimmune, Hepatitis-B surface antigen negative, HIV negative, RPR negative, and Group B strep negative    LABS:     Recent Results (from the past 24 hours)   POCT Rupture of membrane    Collection Time: 02/10/25  6:37 PM   Result " Value Ref Range    Rupture of Membrane Positive (A) Negative     Acceptable Yes          Imaging Results    None          ASSESMENT: Gagandeep Blank is a 24 y.o.   at 40w6d with onset of labor and spontaneous rupture of membranes in active labor  Observation in [facility]-admit to primary OB team on labor and delivery    Plan:  Admit to labor and delivery to primary OB team for expected management.        Admission Diagnosis/Clinical Impression:  :  Post term pregnancy over 40 weeks  Normal labor (Primary)    Status:Stable        This note was created with the assistance of Hip[employer]ip voice recognition software. There may be transcription errors as a result of using this technology however minimal. Effort has been made to assure accuracy of transcription but any obvious errors or omissions should be clarified with the author of the document.

## 2025-02-12 VITALS
RESPIRATION RATE: 17 BRPM | OXYGEN SATURATION: 94 % | BODY MASS INDEX: 35.44 KG/M2 | HEIGHT: 63 IN | TEMPERATURE: 98 F | DIASTOLIC BLOOD PRESSURE: 74 MMHG | SYSTOLIC BLOOD PRESSURE: 114 MMHG | HEART RATE: 89 BPM | WEIGHT: 200 LBS

## 2025-02-12 PROCEDURE — 25000003 PHARM REV CODE 250: Performed by: OBSTETRICS & GYNECOLOGY

## 2025-02-12 RX ADMIN — DOCUSATE SODIUM 200 MG: 100 CAPSULE, LIQUID FILLED ORAL at 08:02

## 2025-02-12 RX ADMIN — IBUPROFEN 600 MG: 600 TABLET, FILM COATED ORAL at 02:02

## 2025-02-12 RX ADMIN — PRENATAL VITAMINS-IRON FUMARATE 27 MG IRON-FOLIC ACID 0.8 MG TABLET 1 TABLET: at 08:02

## 2025-02-12 RX ADMIN — IBUPROFEN 600 MG: 600 TABLET, FILM COATED ORAL at 08:02

## 2025-02-12 NOTE — DISCHARGE SUMMARY
"Delivery Discharge Summary  Obstetrics      Primary OB Clinician: Lashay Junior MD    Discharge Provider: HEMA AMIN MD    Admission date: 2/10/2025  Discharge date: 2025    Admit Dx:   Discharge Dx:    Patient Active Problem List   Diagnosis    Vaginal delivery       Procedure:     Hospital Course:  Gagandeep Blank is a 24 y.o. now  who was admitted on 2/10/2025 for delivery. Patient delivered a viable . Please see delivery note for further details. Pt was in stable condition post delivery and was transferred to the Mother-Baby Unit. Her postpartum course was uncomplicated. On the date of discharge, patient's pain is controlled with oral pain medications. She is tolerating ambulation without SOB or CP, and PO diet without N/V. Reported lochia is within the normal range. Pt in stable condition and ready for discharge.     Pertinent studies:  Postpartum CBC  Lab Results   Component Value Date    WBC 13.57 (H) 02/10/2025    HGB 11.2 (L) 02/10/2025    HCT 34.5 (L) 02/10/2025    MCV 79.3 (L) 02/10/2025     02/10/2025       Delivery:    Episiotomy: None   Lacerations: 2nd   Repair suture:     Repair # of packets: 1   Blood loss (ml):       Birth information:  YOB: 2025   Time of birth: 1:29 AM   Sex: female   Delivery type: Vaginal, Spontaneous   Gestational Age: 41w0d     Measurements    Weight: 3827 g  Weight (lbs): 8 lb 7 oz  Length: 51.4 cm  Length (in): 20.25"  Head circumference: 36.2 cm         Delivery Clinician: Delivery Providers    Delivering clinician: Lashay Junior MD   Provider Role    Collette, Sybil, RN Registered Nurse    Almita Almeida RN Registered Nurse             Additional  information:  Forceps:    Vacuum:    Breech:    Observed anomalies      Living?:     Apgars    Living status: Living  Apgar Component Scores:  1 min.:  5 min.:  10 min.:  15 min.:  20 min.:    Skin color:  0  1       Heart rate:  2  2       Reflex irritability:  2  2     "   Muscle tone:  2  2       Respiratory effort:  2  2       Total:  8  9       Apgars assigned by: D COLLETTE RN         Placenta: Delivered:       appearance    Disposition: To home, self care    Follow Up: 6 weeks    Patient Instructions:   1. Call the office for any bleeding >2 pads/hour for >2 hours, temperature >100.4, pain that is uncontrolled with medications, or for any other concerns.  2. Pelvic rest and no tub baths x 6 weeks.  3. No driving while on narcotics.    Current Discharge Medication List        CONTINUE these medications which have NOT CHANGED    Details   prenatal vit/iron fum/folic ac (PRENATAL 1+1 ORAL) Take by mouth.           STOP taking these medications       doxylamine succinate/vit B6 (DICLEGIS ORAL) Comments:   Reason for Stopping:         ondansetron (ZOFRAN) 8 MG tablet Comments:   Reason for Stopping:               HEMA AMIN

## 2025-02-12 NOTE — NURSING
Discharge education completed. No distress noted. Educated on signs/symptoms to look for, when to return to the hospital/call MD. Educated on proper medication schedule following discharge and attending follow up appointment with MD. Patient and spouse verbalized understanding.

## 2025-02-12 NOTE — PLAN OF CARE
"  Problem:  Fall Injury Risk  Goal: Absence of Fall, Infant Drop and Related Injury  Outcome: Progressing     Problem: Adult Inpatient Plan of Care  Goal: Plan of Care Review  Outcome: Progressing  Goal: Patient-Specific Goal (Individualized)  Description: "I want to bottle feed my baby"  Outcome: Progressing  Goal: Absence of Hospital-Acquired Illness or Injury  Outcome: Progressing  Goal: Optimal Comfort and Wellbeing  Outcome: Progressing  Goal: Readiness for Transition of Care  Outcome: Progressing     Problem: Infection  Goal: Absence of Infection Signs and Symptoms  Outcome: Progressing     Problem: Postpartum (Vaginal Delivery)  Goal: Successful Parent Role Transition  Outcome: Progressing  Goal: Hemostasis  Outcome: Progressing  Goal: Absence of Infection Signs and Symptoms  Outcome: Progressing  Goal: Anesthesia/Sedation Recovery  Outcome: Progressing  Goal: Optimal Pain Control and Function  Outcome: Progressing  Goal: Effective Urinary Elimination  Outcome: Progressing     "